# Patient Record
(demographics unavailable — no encounter records)

---

## 2024-11-08 NOTE — HISTORY OF PRESENT ILLNESS
[FreeTextEntry1] : Patient reports feeling well. Has started exercising and improved his diet. Has maintained weight loss. Denies any recurrence of palpitations. Reports home blood pressures averaging 120's/80 mm Hg. He noted that home blood pressures were improving and stopped Amlodipine on his own. Exercises less frequently, Denies exertional symptoms. He denies any chest pain, dizziness, syncope, near-syncope, SOB, edema, orthopnea or PND.  Has started working again, works in construction.

## 2024-11-08 NOTE — REASON FOR VISIT
[FreeTextEntry1] : TONIA CARRILLO is a 64-year-old M presents here for cardiac follow-up for blood pressure management   He has a hx of post-concussion syndrome, blood pressure elevations, chest discomfort, palpitations and mild aortic dilatation.   + Family hx CAD includes his sister who was stented in her 50's.  No family history of premature sudden cardiac death or arrhythmia.

## 2024-11-08 NOTE — REVIEW OF SYSTEMS
[Weight Loss (___ Lbs)] : [unfilled] ~Ulb weight loss [Joint Pain] : joint pain [Negative] : Heme/Lymph [Weight Gain (___ Lbs)] : no recent weight gain [Dyspnea on exertion] : not dyspnea during exertion [Chest Discomfort] : no chest discomfort [Confusion] : no confusion was observed [Anxiety] : no anxiety [FreeTextEntry2] : Other than as documented here and in the HPI, the thirteen point ROS is negative

## 2024-11-08 NOTE — ASSESSMENT
[FreeTextEntry1] : ECG: Normal sinus rhythm at 61 with APCs otherwise no significant ST-T wave changes.  ECG obtained to assist in diagnosis and management of assessed problem(s).  Laboratory data: ----4/18/2024 Chol---163 HDL----54 LDL----93 Trigs---69  ECHOCARDIOGRAM 5/3/24: LVEF 60-65% mildly dilated LA Mild MR PASP 37 mmHg consistent with mild pulmonary hypertension Ascending aorta measuring 3.9 cm  Exercise stress test 11/15/2022: Time: 9 minutes 15 seconds (10 METS Heart rate: 142 (90% Blood pressure: 174/80 ECG no ischemia but frequent PACs Duke score: 9-low risk.  Echocardiogram: 9/29/2022: Normal LV size and function LVEF 55 to 60% No significant valve disease Mild dilatation of ascending aorta 3.9 cm  Impression: 64-year-old male here for cardiac follow-up.  1. Blood pressure well controlled without antihypertensives, possibly related to recent weight loss.   2.  Echocardiogram showed no wall motion abnormalities, and normal LV function. Mild dilatation of the ascending aorta stable at 3.9 cm. No significant change compared to prior. PASP mildly elevated. Has no hx of pulmonary dx or sleep apnea. No signs or symptoms of HF.   3. Now exercising regularly. No anginal symptoms.        4. Event monitor showed no SVT or VT. No recent palpitations.   5. Elevated lipids in the past, improved with diet and exercise. Lipids being monitored by PMD, reports lipids also improving, records not available today.    Plan: 1. continue to monitor home BP daily and bring BP log to follow-up. Avoid salt. May remain off Amlodipine.  2. Continue diet and exercise to lower cholesterol. Repeat lipid panel prior to F/U through his PMD.  3. Pt advised to notify us if his symptoms return.  4. Continue to follow a heart healthy diet consisting of more vegetables, leans meats, whole grains, nuts and fruits. Avoid trans fats, saturated fats and processed meats. 5. Pt advised to continue to exercise for at least 30 minutes most days of the week. Any cardiac symptoms such as chest pain, palpitations or new shortness of breath should be reported. 6. No indication for additional testing or medication therapy for now.   Clinical follow-up in 6 months and will eventually consider repeat noninvasive testing.

## 2024-11-08 NOTE — PHYSICAL EXAM
[de-identified] :                    Well appearing and nourished with no obvious deformities or distress.   Eyes:  No conjunctival injection and no xanthelasmas. HEENT:  Normocephalic.Normal oral mucosa. No pallor or cyanosis Neck:  No jugular venous distension. with normal A and V wave forms. No palpable adenopathy. Cardiovascular:  Normal rate and rhythm with normal S1, S2 and a grade 1/6 systolic murmur. Distal arterial pulses are normal. No significant peripheral edema.  Pulmonary:  Lungs are clear to auscultation and percussion. Normal respiratory pattern without any accessory muscle use Abdomen:  Soft, non-tender ; no palpable organomegaly or masses. Extremities: No digital clubbing, cyanosis or ischemic changes. +2 bilateral pedal pulse Skin:  No skin lesions, rashes, ulcers or xanthomas. Psychiatric:  Alert and oriented to person, place and time. Appropriate mood and affect.